# Patient Record
Sex: FEMALE | Race: WHITE | NOT HISPANIC OR LATINO
[De-identification: names, ages, dates, MRNs, and addresses within clinical notes are randomized per-mention and may not be internally consistent; named-entity substitution may affect disease eponyms.]

---

## 2017-12-04 ENCOUNTER — APPOINTMENT (OUTPATIENT)
Dept: OTOLARYNGOLOGY | Facility: CLINIC | Age: 62
End: 2017-12-04

## 2021-07-07 ENCOUNTER — APPOINTMENT (OUTPATIENT)
Age: 66
End: 2021-07-07
Payer: MEDICARE

## 2021-07-07 PROCEDURE — 99213 OFFICE O/P EST LOW 20 MIN: CPT | Mod: 95

## 2021-07-07 RX ORDER — FLUTICASONE PROPIONATE AND SALMETEROL 250; 50 UG/1; UG/1
250-50 POWDER RESPIRATORY (INHALATION)
Qty: 1 | Refills: 4 | Status: ACTIVE | COMMUNITY
Start: 2021-07-07 | End: 1900-01-01

## 2021-07-07 NOTE — HISTORY OF PRESENT ILLNESS
[Home] : at home, [unfilled] , at the time of the visit. [Medical Office: ([facility])___] : at the medical office located in  [Verbal consent obtained from patient] : the patient, [unfilled] [Mild Persistent] : mild persistent [Doing Well] : doing well [Well Controlled] : Well controlled [Checks Regularly] : The patient checks ~his/her~ peak flow regularly [Good Control] : peak flow has been good [None] : None [Adherent] : the patient is adherent with ~his/her~ medication regimen [Goals--Doing Well] : the patient is doing well with ~his/her~ asthma goals [PFTs] : pulmonary function tests [Follow-Up - Routine Clinic] : a routine clinic follow-up of

## 2021-10-06 ENCOUNTER — APPOINTMENT (OUTPATIENT)
Age: 66
End: 2021-10-06

## 2022-01-13 ENCOUNTER — APPOINTMENT (OUTPATIENT)
Age: 67
End: 2022-01-13
Payer: MEDICARE

## 2022-01-13 DIAGNOSIS — R91.1 SOLITARY PULMONARY NODULE: ICD-10-CM

## 2022-01-13 DIAGNOSIS — J45.909 UNSPECIFIED ASTHMA, UNCOMPLICATED: ICD-10-CM

## 2022-01-13 PROCEDURE — 99441: CPT | Mod: 95

## 2022-01-13 NOTE — ASSESSMENT
[FreeTextEntry1] : MIld persistent asthma on [place]air \par GG 4 mm lung nodule stable did not have her repeat CT chest

## 2022-01-13 NOTE — HISTORY OF PRESENT ILLNESS
[Home] : at home, [unfilled] , at the time of the visit. [Medical Office: ([facility])___] : at the medical office located in  [Verbal consent obtained from patient] : the patient, [unfilled] [Mild Persistent] : mild persistent [Doing Well] : doing well [Well Controlled] : Well controlled [Checks Regularly] : The patient checks ~his/her~ peak flow regularly [Good Control] : peak flow has been good [None] : None [Adherent] : the patient is adherent with ~his/her~ medication regimen [Goals--Doing Well] : the patient is doing well with ~his/her~ asthma goals [PFTs] : pulmonary function tests [Follow-Up - Routine Clinic] : a routine clinic follow-up of

## 2022-04-11 ENCOUNTER — PREPPED CHART (OUTPATIENT)
Dept: URBAN - METROPOLITAN AREA CLINIC 94 | Facility: CLINIC | Age: 67
End: 2022-04-11

## 2022-04-11 PROBLEM — H43.391 VITREOUS FLOATERS: Noted: 2022-04-11

## 2022-04-11 PROBLEM — H25.093 INCIPIENT SENILE CATARACT: Noted: 2022-04-11

## 2022-04-11 PROBLEM — H43.22 ASTEROID HYALOSIS: Noted: 2022-04-11

## 2022-04-11 PROBLEM — H02.889 MEIBOMIAN GLAND DYSFUNCTION: Noted: 2022-04-11

## 2022-04-11 PROBLEM — H10.13 ACUTE ALLERGIC CONJUNCTIVITIS: Noted: 2022-04-11
